# Patient Record
Sex: MALE
[De-identification: names, ages, dates, MRNs, and addresses within clinical notes are randomized per-mention and may not be internally consistent; named-entity substitution may affect disease eponyms.]

---

## 2018-10-15 ENCOUNTER — HOSPITAL ENCOUNTER (EMERGENCY)
Dept: HOSPITAL 97 - ER | Age: 33
Discharge: HOME | End: 2018-10-15
Payer: SELF-PAY

## 2018-10-15 DIAGNOSIS — Z72.0: ICD-10-CM

## 2018-10-15 DIAGNOSIS — K21.9: Primary | ICD-10-CM

## 2018-10-15 LAB
ALBUMIN SERPL BCP-MCNC: 4 G/DL (ref 3.4–5)
ALP SERPL-CCNC: 72 U/L (ref 45–117)
ALT SERPL W P-5'-P-CCNC: 26 U/L (ref 12–78)
AST SERPL W P-5'-P-CCNC: 18 U/L (ref 15–37)
BUN BLD-MCNC: 17 MG/DL (ref 7–18)
GLUCOSE SERPLBLD-MCNC: 91 MG/DL (ref 74–106)
HCT VFR BLD CALC: 44.8 % (ref 39.6–49)
LIPASE SERPL-CCNC: 175 U/L (ref 73–393)
LYMPHOCYTES # SPEC AUTO: 1.8 K/UL (ref 0.7–4.9)
MCH RBC QN AUTO: 31.9 PG (ref 27–35)
MCV RBC: 93.3 FL (ref 80–100)
PMV BLD: 8.7 FL (ref 7.6–11.3)
POTASSIUM SERPL-SCNC: 3.9 MMOL/L (ref 3.5–5.1)
RBC # BLD: 4.8 M/UL (ref 4.33–5.43)

## 2018-10-15 PROCEDURE — 85025 COMPLETE CBC W/AUTO DIFF WBC: CPT

## 2018-10-15 PROCEDURE — 80076 HEPATIC FUNCTION PANEL: CPT

## 2018-10-15 PROCEDURE — 81003 URINALYSIS AUTO W/O SCOPE: CPT

## 2018-10-15 PROCEDURE — 99284 EMERGENCY DEPT VISIT MOD MDM: CPT

## 2018-10-15 PROCEDURE — 83690 ASSAY OF LIPASE: CPT

## 2018-10-15 PROCEDURE — 80048 BASIC METABOLIC PNL TOTAL CA: CPT

## 2018-10-15 PROCEDURE — 96374 THER/PROPH/DIAG INJ IV PUSH: CPT

## 2018-10-15 PROCEDURE — 96375 TX/PRO/DX INJ NEW DRUG ADDON: CPT

## 2018-10-15 PROCEDURE — 96361 HYDRATE IV INFUSION ADD-ON: CPT

## 2018-10-15 PROCEDURE — 74177 CT ABD & PELVIS W/CONTRAST: CPT

## 2018-10-15 PROCEDURE — 36415 COLL VENOUS BLD VENIPUNCTURE: CPT

## 2018-10-15 PROCEDURE — 76705 ECHO EXAM OF ABDOMEN: CPT

## 2018-10-15 NOTE — ER
Nurse's Notes                                                                                     

 [facility]                                                                

Name: Sergio Reyes                                                                                

Age: 33 yrs                                                                                       

Sex: Male                                                                                         

: 1985                                                                                   

MRN: [id number]                                                                                   

Arrival Date: 10/15/2018                                                                          

Time: 15:49                                                                                       

Account#: [id number]                                                                            

Bed 28                                                                                            

Private MD:                                                                                       

Diagnosis: Abdominal tenderness;Gastro-esophageal reflux disease                                  

                                                                                                  

Presentation:                                                                                     

10/15                                                                                             

15:50 Presenting complaint: Patient states: my upper stomach hurts and heart burn for the     ch  

      past 2-3 days. Transition of care: patient was not received from another setting of         

      care. Onset of symptoms was 2018. Risk Assessment: Do you want to hurt          

      yourself or someone else? Patient reports no desire to harm self or others. Initial         

      Sepsis Screen: Does the patient meet any 2 criteria? No. Patient's initial sepsis           

      screen is negative. Does the patient have a suspected source of infection? No.              

      Patient's initial sepsis screen is negative. Care prior to arrival: None.                   

15:50 Method Of Arrival: Ambulatory                                                             

15:50 Acuity: SKYLAR 3                                                                             

                                                                                                  

Triage Assessment:                                                                                

15:51 General: Appears in no apparent distress. comfortable, Behavior is calm, cooperative,     

      appropriate for age. Pain: Complains of pain in epigastric area, right upper quadrant       

      and left upper quadrant Pain currently is 6 out of 10 on a pain scale. GI: Reports          

      upper abdominal pain, epigastric pain, "heart burn".                                        

                                                                                                  

Historical:                                                                                       

- Allergies:                                                                                      

15:51 No Known Allergies;                                                                       

- Home Meds:                                                                                      

15:51 Omeprazole Oral [Active];                                                                 

- PMHx:                                                                                           

15:51 stomach inflamation;                                                                      

- PSHx:                                                                                           

15:51 None;                                                                                     

                                                                                                  

- Immunization history:: Adult Immunizations up to date, Flu vaccine is not up to date.           

- Social history:: Smoking status: Patient uses tobacco products, denies chronic                  

  smoking, but will smoke occasionally, Patient uses alcohol, weekly.                             

- Ebola Screening: : Patient negative for fever greater than or equal to 101.5 degrees            

  Fahrenheit, and additional compatible Ebola Virus Disease symptoms Patient denies               

  exposure to infectious person Patient denies travel to an Ebola-affected area in the            

  21 days before illness onset No symptoms or risks identified at this time.                      

- Family history:: not pertinent.                                                                 

                                                                                                  

                                                                                                  

Screenin:00 Abuse screen: Denies threats or abuse. Denies injuries from another. Nutritional        kr2 

      screening: No deficits noted. Tuberculosis screening: No symptoms or risk factors           

      identified. Fall Risk None identified.                                                      

                                                                                                  

Assessment:                                                                                       

16:00 General: Appears in no apparent distress. uncomfortable, well groomed, well developed,  kr2 

      well nourished, Behavior is calm, cooperative, appropriate for age. Pain: Complains of      

      pain in epigastric area, right upper quadrant and left upper quadrant Pain does not         

      radiate. Pain currently is 5 out of 10 on a pain scale. Quality of pain is described as     

      pressure, tender, Is continuous, Alleviated by rest, Aggravated by repositioning.           

      Neuro: Level of Consciousness is awake, alert, obeys commands, Oriented to person,          

      place, time, situation, Appropriate for age. Cardiovascular: Capillary refill < 3           

      seconds in bilateral fingers Patient's skin is warm and dry. Respiratory: Airway is         

      patent Respiratory effort is even, unlabored, Respiratory pattern is regular,               

      symmetrical. GI: Bowel sounds present X 4 quads. Abd is soft X 4 quads Abdomen is           

      tender to palpation in epigastric area. Derm: Skin is intact, is healthy with good          

      turgor, Skin is pink, warm \T\ dry. Musculoskeletal: Circulation, motion, and sensation     

      intact.                                                                                     

17:00 Reassessment: Patient appears in no apparent distress at this time. Patient and/or      kr2 

      family updated on plan of care and expected duration. Pain level reassessed. Patient is     

      alert, oriented x 3, equal unlabored respirations, skin warm/dry/pink. Patient states       

      feeling better.                                                                             

18:00 Reassessment: Patient appears in no apparent distress at this time. Patient and/or      kr2 

      family updated on plan of care and expected duration. Pain level reassessed. Patient is     

      alert, oriented x 3, equal unlabored respirations, skin warm/dry/pink. Patient states       

      feeling better. Patient states symptoms have improved.                                      

                                                                                                  

Vital Signs:                                                                                      

15:51  / 90; Pulse 64; Resp 14; Temp 98.9; Pulse Ox 100% on R/A; Weight 79.38 kg;         

      Height 5 ft. 6 in. (167.64 cm); Pain 6/10;                                                  

16:00  / 84; Pulse 66; Resp 15; Pulse Ox 100% ;                                         kr2 

18:15  / 83; Pulse 66; Resp 15; Pulse Ox 100% ;                                         kr2 

15:51 Body Mass Index 28.25 (79.38 kg, 167.64 cm)                                               

                                                                                                  

ED Course:                                                                                        

15:49 Patient arrived in ED.                                                                    

15:51 Triage completed.                                                                         

15:51 Arm band placed on right wrist. Patient placed in an exam room, on a stretcher.           

16:00 Patient has correct armband on for positive identification. Bed in low position. Call   kr2 

      light in reach. Side rails up X 1. Pulse ox on. NIBP on. Door closed. Warm blanket          

      given. Head of bed elevated.                                                                

16:02 Gelacio Ray MD is Attending Physician.                                             [facility] 

16:15 Inserted saline lock: 20 gauge in left antecubital area, using aseptic technique. Blood kr2 

      collected.                                                                                  

16:19 Geneva Burleson, RN is Primary Nurse.                                                     kr2 

17:30 CT completed. Patient tolerated procedure well. Patient moved to CT. Patient moved back nj  

      from CT.                                                                                    

17:30 CT Abd/Pelvis - W/Contrast In Process Unspecified.                                      EDMS

17:41 US Abdomen Limited In Process Unspecified.                                              EDMS

17:50 Todd Ha MD is Referral Physician.                                              juve 

18:11 No provider procedures requiring assistance completed. IV discontinued, intact,         kr2 

      bleeding controlled, No redness/swelling at site. Pressure dressing applied.                

                                                                                                  

Administered Medications:                                                                         

16:37 Drug: Zofran 4 mg Route: IVP; Site: left antecubital;                                   rv  

18:16 Follow up: Response: No adverse reaction                                                kr2 

16:38 Drug: NS 0.9% 1000 ml Route: IV; Rate: 1 bolus; Site: left antecubital;                 rv  

17:45 Follow up: Response: No adverse reaction; IV Status: Completed infusion                 kr2 

16:38 Drug: ProTONIX 40 mg Route: IVP; Site: left antecubital;                                rv  

18:16 Follow up: Response: No adverse reaction                                                kr2 

16:38 Drug: morphine 2 mg Route: IVP; Site: left antecubital;                                 rv  

17:00 Follow up: Response: No adverse reaction; Pain is decreased                             kr2 

17:58 Not Given (Patient Refused): GI Cocktail without Donnatal - (Maalox Suspension 30 ml,   kr2 

      Lidocaine Liquid 2 % 15 ml) PO once                                                         

18:06 Not Given (Patient Refused): morphine 2 mg IVP once                                     kr2 

                                                                                                  

                                                                                                  

Outcome:                                                                                          

17:49 Discharge ordered by MD.                                                                juve 

18:14 Discharged to home ambulatory.                                                          kr2 

18:14 Condition: good                                                                             

18:14 Discharge instructions given to patient, family, Instructed on discharge instructions,      

      follow up and referral plans. medication usage, Demonstrated understanding of               

      instructions, follow-up care, medications, Prescriptions given X 4.                         

18:17 Patient left the ED.                                                                    kr2 

                                                                                                  

Signatures:                                                                                       

Dispatcher [employer]                           EDMS                                                 

Babs Lindquist, RN                  RN   Gelacio Piña MD MD cha Jordan, Nathan nj Reaves, Karey, RN                       RN   kr2                                                  

Vlad Dean RN                    RN   rv                                                   

                                                                                                  

**************************************************************************************************

## 2018-10-15 NOTE — RAD REPORT
EXAM DESCRIPTION:  US - Abdomen Exam Limited - 10/15/2018 5:41 pm

 

CLINICAL HISTORY:  ABD PAIN

 

COMPARISON:  No comparisons

 

FINDINGS:  The gallbladder demonstrates no gallstones. No pericholecystic fluid or gallbladder wall t
hickening. The common bile duct is normal measuring 3 mm.

 

The liver demonstrates no findings of intrahepatic biliary dilatation.

 

IMPRESSION:  Unremarkable examination.

## 2018-10-15 NOTE — RAD REPORT
EXAM DESCRIPTION:  CT - Abdomen   Pelvis W Contrast - 10/15/2018 5:30 pm

 

CLINICAL HISTORY:  Abdominal pain

 

COMPARISON:  None.

 

TECHNIQUE:  Biphasic, helical CT imaging of the abdomen and pelvis was performed following 100 ml non
-ionic IV contrast. Oral contrast was given.

 

All CT scans are performed using dose optimization technique as appropriate and may include automated
 exposure control or mA/KV adjustment according to patient size.

 

FINDINGS:  No suspicious findings in the lung bases.

 

The liver, spleen, and pancreas show no suspicious findings. No gallbladder abnormality seen. Gallsto
shey can be occult. There is mild prominence of the intrahepatic biliary tree without extrahepatic dil
atation. Duodenal C-loop is incompletely distended by oral contrast. This limits accurate assessment 
of the duodenal mucosa or the ampulla. No peripancreatic stranding.

 

Symmetric renal function is seen with no hydronephrosis or suspicious renal mass. No pyelonephritis o
r acute renal parenchymal process. No adrenal gland suspicious finding. No urinary bladder abnormalit
y seen. Prostate gland and seminal vesicles within normal range.

 

No gastric dilatation or gastric wall thickening. Small bowel loops are not dilated. There are fluid-
filled small bowel loops. A few scattered mesenteric lymph nodes are present. The appendix is normal.
 No acute colon process.  No free air, free fluid or inflammatory stranding.  No hernia, mass or bulk
y lymphadenopathy.

 

No suspicious bony findings.

 

 

IMPRESSION:  No appendicitis or surgically emergent finding.

 

Fluid filled nondilated small bowel loops are present with a few mesenteric lymph nodes. Enteritis is
 certainly possible if there is a matching clinical presentation.

 

Mild prominence of the intrahepatic biliary tree without extrahepatic biliary tree or gallbladder acu
te finding. Significance is doubtful unless there are biliary obstructive clinical or laboratory find
ings.

## 2018-10-15 NOTE — EDPHYS
Physician Documentation                                                                           

 [facility]                                                                

Name: Sergio Reyes                                                                                

Age: 33 yrs                                                                                       

Sex: Male                                                                                         

: 1985                                                                                   

MRN: [id number]                                                                                   

Arrival Date: 10/15/2018                                                                          

Time: 15:49                                                                                       

Account#: [id number]                                                                            

Bed 28                                                                                            

Private MD:                                                                                       

[facility] Physician Gelacio Ray                                                                      

HPI:                                                                                              

10/15                                                                                             

17:03 This 33 yrs old  Male presents to ER via Ambulatory with complaints of          juve 

      Abdominal Cramping.                                                                         

17:03 The patient presents with abdominal pain in the epigastric area, in the upper abdomen.  juve 

      Onset: The symptoms/episode began/occurred 2 day(s) ago. The symptoms do not radiate.       

      Associated signs and symptoms: none. The symptoms are described as crampy. Modifying        

      factors: The symptoms are alleviated by nothing. Severity of pain: At its worst the         

      pain was mild moderate. The patient has not experienced similar symptoms in the past.       

                                                                                                  

Historical:                                                                                       

- Allergies:                                                                                      

15:51 No Known Allergies;                                                                     ch  

- Home Meds:                                                                                      

15:51 Omeprazole Oral [Active];                                                               ch  

- PMHx:                                                                                           

15:51 stomach inflamation;                                                                    ch  

- PSHx:                                                                                           

15:51 None;                                                                                   ch  

                                                                                                  

- Immunization history:: Adult Immunizations up to date, Flu vaccine is not up to date.           

- Social history:: Smoking status: Patient uses tobacco products, denies chronic                  

  smoking, but will smoke occasionally, Patient uses alcohol, weekly.                             

- Ebola Screening: : Patient negative for fever greater than or equal to 101.5 degrees            

  Fahrenheit, and additional compatible Ebola Virus Disease symptoms Patient denies               

  exposure to infectious person Patient denies travel to an Ebola-affected area in the            

  21 days before illness onset No symptoms or risks identified at this time.                      

- Family history:: not pertinent.                                                                 

                                                                                                  

                                                                                                  

ROS:                                                                                              

17:03 Constitutional: Negative for fever, chills, and weight loss, Eyes: Negative for injury, juve 

      pain, redness, and discharge, ENT: Negative for injury, pain, and discharge, Neck:          

      Negative for injury, pain, and swelling, Cardiovascular: Negative for chest pain,           

      palpitations, and edema, Respiratory: Negative for shortness of breath, cough,              

      wheezing, and pleuritic chest pain, Back: Negative for injury and pain, : Negative        

      for injury, bleeding, discharge, and swelling, MS/Extremity: Negative for injury and        

      deformity, Skin: Negative for injury, rash, and discoloration, Neuro: Negative for          

      headache, weakness, numbness, tingling, and seizure, Psych: Negative for depression,        

      anxiety, suicide ideation, homicidal ideation, and hallucinations, Allergy/Immunology:      

      Negative for hives, rash, and allergies, Endocrine: Negative for neck swelling,             

      polydipsia, polyuria, polyphagia, and marked weight changes, Hematologic/Lymphatic:         

      Negative for swollen nodes, abnormal bleeding, and unusual bruising.                        

17:03 Abdomen/GI: Positive for abdominal pain, of the epigastric area, right upper quadrant       

      and left upper quadrant.                                                                    

                                                                                                  

Exam:                                                                                             

17:03 Constitutional:  This is a well developed, well nourished patient who is awake, alert,  juve 

      and in no acute distress. Head/Face:  Normocephalic, atraumatic. Eyes:  Pupils equal        

      round and reactive to light, extra-ocular motions intact.  Lids and lashes normal.          

      Conjunctiva and sclera are non-icteric and not injected.  Cornea within normal limits.      

      Periorbital areas with no swelling, redness, or edema. ENT:  Nares patent. No nasal         

      discharge, no septal abnormalities noted.  Tympanic membranes are normal and external       

      auditory canals are clear.  Oropharynx with no redness, swelling, or masses, exudates,      

      or evidence of obstruction, uvula midline.  Mucous membranes moist. Neck:  Trachea          

      midline, no thyromegaly or masses palpated, and no cervical lymphadenopathy.  Supple,       

      full range of motion without nuchal rigidity, or vertebral point tenderness.  No            

      Meningismus. Chest/axilla:  Normal chest wall appearance and motion.  Nontender with no     

      deformity.  No lesions are appreciated. Cardiovascular:  Regular rate and rhythm with a     

      normal S1 and S2.  No gallops, murmurs, or rubs.  Normal PMI, no JVD.  No pulse             

      deficits. Respiratory:  Lungs have equal breath sounds bilaterally, clear to                

      auscultation and percussion.  No rales, rhonchi or wheezes noted.  No increased work of     

      breathing, no retractions or nasal flaring. Back:  No spinal tenderness.  No                

      costovertebral tenderness.  Full range of motion. Male :  Normal genitalia with no        

      discharge or lesions. Skin:  Warm, dry with normal turgor.  Normal color with no            

      rashes, no lesions, and no evidence of cellulitis. MS/ Extremity:  Pulses equal, no         

      cyanosis.  Neurovascular intact.  Full, normal range of motion. Neuro:  Awake and           

      alert, GCS 15, oriented to person, place, time, and situation.  Cranial nerves II-XII       

      grossly intact.  Motor strength 5/5 in all extremities.  Sensory grossly intact.            

      Cerebellar exam normal.  Normal gait. Psych:  Awake, alert, with orientation to person,     

      place and time.  Behavior, mood, and affect are within normal limits.                       

17:03 Abdomen/GI: Inspection: abdomen appears normal, Bowel sounds: normal, Palpation: mild       

      abdominal tenderness, in the epigastric area, right upper quadrant and left upper           

      quadrant, Liver: is firm, Hernia: not appreciated.                                          

                                                                                                  

Vital Signs:                                                                                      

15:51  / 90; Pulse 64; Resp 14; Temp 98.9; Pulse Ox 100% on R/A; Weight 79.38 kg;       ch  

      Height 5 ft. 6 in. (167.64 cm); Pain 6/10;                                                  

16:00  / 84; Pulse 66; Resp 15; Pulse Ox 100% ;                                         kr2 

18:15  / 83; Pulse 66; Resp 15; Pulse Ox 100% ;                                         kr2 

15:51 Body Mass Index 28.25 (79.38 kg, 167.64 cm)                                               

                                                                                                  

MDM:                                                                                              

16:02 Patient medically screened.                                                             [facility] 

17:05 Data reviewed: vital signs, nurses notes, lab test result(s), EKG, radiologic studies,  [facility] 

      CT scan, ultrasound.                                                                        

                                                                                                  

10/15                                                                                             

16:03 Order name: Basic Metabolic Panel                                                       [facility] 

10/15                                                                                             

16:03 Order name: CBC with Diff; Complete Time: 17:10                                         [facility] 

10/15                                                                                             

16:03 Order name: Creatinine for Radiology; Complete Time: 17:10                              [facility] 

10/15                                                                                             

16:03 Order name: Hepatic Function; Complete Time: 17:10                                      [facility] 

10/15                                                                                             

16:03 Order name: Lipase; Complete Time: 17:10                                                [facility] 

10/15                                                                                             

16:04 Order name: Basic Metabolic Panel; Complete Time: 17:10                                 [facility]

10/15                                                                                             

17:03 Order name: CT Abd/Pelvis - W/Contrast; Complete Time: 17:49                            [facility] 

10/15                                                                                             

17:10 Order name: US Abdomen Limited                                                          [facility] 

10/15                                                                                             

18:12 Order name: Urine Dipstick--Ancillary (enter results)                                     

10/15                                                                                             

16:03 Order name: IV Saline Lock; Complete Time: 17:47                                        [facility] 

10/15                                                                                             

16:03 Order name: Labs collected and sent; Complete Time: 17:47                               [facility] 

                                                                                                  

Administered Medications:                                                                         

16:37 Drug: Zofran 4 mg Route: IVP; Site: left antecubital;                                   rv  

18:16 Follow up: Response: No adverse reaction                                                kr2 

16:38 Drug: NS 0.9% 1000 ml Route: IV; Rate: 1 bolus; Site: left antecubital;                 rv  

17:45 Follow up: Response: No adverse reaction; IV Status: Completed infusion                 kr2 

16:38 Drug: ProTONIX 40 mg Route: IVP; Site: left antecubital;                                rv  

18:16 Follow up: Response: No adverse reaction                                                kr2 

16:38 Drug: morphine 2 mg Route: IVP; Site: left antecubital;                                 rv  

17:00 Follow up: Response: No adverse reaction; Pain is decreased                             kr2 

17:58 Not Given (Patient Refused): GI Cocktail without Donnatal - (Maalox Suspension 30 ml,   kr2 

      Lidocaine Liquid 2 % 15 ml) PO once                                                         

18:06 Not Given (Patient Refused): morphine 2 mg IVP once                                     kr2 

                                                                                                  

                                                                                                  

Disposition:                                                                                      

10/15/18 17:49 Discharged to Home. Impression: Abdominal tenderness, Gastro-esophageal reflux     

  disease.                                                                                        

- Condition is Stable.                                                                            

- Discharge Instructions: Abdominal Pain, Adult, Food Choices for Gastroesophageal                

  Reflux Disease, Adult, Gastritis, Adult, Gastritis, Adult, Easy-to-Read, Abdominal              

  Pain, Adult, Easy-to-Read, Food Choices for Gastroesophageal Reflux Disease, Adult,             

  Easy-to-Read.                                                                                   

- Prescriptions for Bentyl 20 mg Oral Tablet - take 1 tablet by ORAL route every 6                

  hours As needed; 20 tablet. Protonix 40 mg Oral Tablet - take 1 tablet by ORAL route            

  once daily; 30 tablet. Zofran 4 mg Oral Tablet - take 1 tablet by ORAL route every 12           

  hours As needed; 20 tablet. Cipro 500 mg Oral Tablet - take 1 tablet by ORAL route              

  every 12 hours for 5 days; 10 tablet.                                                           

- Medication Reconciliation Form, Thank You Letter, Antibiotic Education, Prescription            

  Opioid Use form.                                                                                

- Follow up: Private Physician; When: 2 - 3 days; Reason: Recheck today's complaints,             

  Continuance of care, Re-evaluation by your physician. Follow up: Todd Ha MD;           

  When: 2 - 3 days; Reason: Recheck today's complaints, Re-evaluation by your physician.          

- Problem is new.                                                                                 

- Symptoms have improved.                                                                         

                                                                                                  

                                                                                                  

                                                                                                  

Signatures:                                                                                       

Dispatcher [employer]                           EDBabs Alcaraz RN                  Gelacio Jacobs ch, MD MD cha Reaves, Karey, RN                       RN   kr2                                                  

Vlad Dean RN                    RN   rv                                                   

                                                                                                  

Corrections: (The following items were deleted from the chart)                                    

17:12 17:03 Abdomen Complete+[id number].BRZ ordered. [facility]

17:50 17:49 10/15/2018 17:49 Discharged to Home. Impression: Abdominal tenderness;            juve 

      Gastro-esophageal reflux disease. Condition is Stable. Discharge Instructions:              

      Abdominal Pain, Adult, Food Choices for Gastroesophageal Reflux Disease, Adult,             

      Gastritis, Adult, Gastritis, Adult, Easy-to-Read, Abdominal Pain, Adult, Easy-to-Read,      

      Food Choices for Gastroesophageal Reflux Disease, Adult, Easy-to-Read. Prescriptions        

      for Bentyl 20 mg Oral Tablet - take 1 tablet by ORAL route every 6 hours As needed; 20      

      tablet, Protonix 40 mg Oral Tablet - take 1 tablet by ORAL route once daily; 30 tablet,     

      Zofran 4 mg Oral Tablet - take 1 tablet by ORAL route every 12 hours As needed; 20          

      tablet. and Forms are Medication Reconciliation Form, Thank You Letter, Antibiotic          

      Education, Prescription Opioid Use. Follow up: Private Physician; When: 2 - 3 days;         

      Reason: Recheck today's complaints, Continuance of care, Re-evaluation by your              

      physician. Problem is new. Symptoms have improved. [facility]                                      

18:17 17:50 10/15/2018 17:49 Discharged to Home. Impression: Abdominal tenderness;            kr2 

      Gastro-esophageal reflux disease. Condition is Stable. Discharge Instructions:              

      Abdominal Pain, Adult, Food Choices for Gastroesophageal Reflux Disease, Adult,             

      Gastritis, Adult, Gastritis, Adult, Easy-to-Read, Abdominal Pain, Adult, Easy-to-Read,      

      Food Choices for Gastroesophageal Reflux Disease, Adult, Easy-to-Read. Prescriptions        

      for Bentyl 20 mg Oral Tablet - take 1 tablet by ORAL route every 6 hours As needed; 20      

      tablet, Protonix 40 mg Oral Tablet - take 1 tablet by ORAL route once daily; 30 tablet,     

      Zofran 4 mg Oral Tablet - take 1 tablet by ORAL route every 12 hours As needed; 20          

      tablet. and Forms are Medication Reconciliation Form, Thank You Letter, Antibiotic          

      Education, Prescription Opioid Use. Follow up: Private Physician; When: 2 - 3 days;         

      Reason: Recheck today's complaints, Continuance of care, Re-evaluation by your              

      physician. Follow up: Todd Ha; When: 2 - 3 days; Reason: Recheck today's             

      complaints, Re-evaluation by your physician. Problem is new. Symptoms have improved. juve    

                                                                                                  

**************************************************************************************************